# Patient Record
Sex: FEMALE | Race: WHITE | Employment: PART TIME | URBAN - METROPOLITAN AREA
[De-identification: names, ages, dates, MRNs, and addresses within clinical notes are randomized per-mention and may not be internally consistent; named-entity substitution may affect disease eponyms.]

---

## 2023-06-12 PROBLEM — Z12.11 SPECIAL SCREENING FOR MALIGNANT NEOPLASM OF COLON: Status: ACTIVE | Noted: 2023-06-12

## 2023-06-12 PROBLEM — Z13.220 SCREENING CHOLESTEROL LEVEL: Status: ACTIVE | Noted: 2023-06-12

## 2023-06-12 PROBLEM — Z11.59 ENCOUNTER FOR HEPATITIS C SCREENING TEST FOR LOW RISK PATIENT: Status: ACTIVE | Noted: 2023-06-12

## 2023-06-12 PROBLEM — Z01.419 ENCOUNTER FOR WELL WOMAN EXAM: Status: ACTIVE | Noted: 2023-06-12

## 2023-06-12 PROBLEM — G50.0 TRIGEMINAL NEURALGIA OF LEFT SIDE OF FACE: Status: ACTIVE | Noted: 2019-07-19

## 2023-06-12 PROBLEM — Z13.1 DIABETES MELLITUS SCREENING: Status: ACTIVE | Noted: 2023-06-12

## 2023-06-12 PROBLEM — R51.9 NONINTRACTABLE HEADACHE: Status: ACTIVE | Noted: 2023-06-12

## 2023-06-12 PROBLEM — Z11.4 ENCOUNTER FOR SCREENING FOR HIV: Status: ACTIVE | Noted: 2023-06-12

## 2023-06-12 PROBLEM — Z23 IMMUNIZATION DUE: Status: ACTIVE | Noted: 2023-06-12

## 2023-06-12 PROBLEM — L02.91 ABSCESS: Status: ACTIVE | Noted: 2023-06-12

## 2023-06-29 ENCOUNTER — TELEPHONE (OUTPATIENT)
Dept: PRIMARY CARE CLINIC | Age: 48
End: 2023-06-29

## 2023-06-29 NOTE — TELEPHONE ENCOUNTER
Called and spoke to patient she stated they called her to schedule but she needs to call them back as soon as she can

## 2023-07-12 PROBLEM — Z13.1 DIABETES MELLITUS SCREENING: Status: RESOLVED | Noted: 2023-06-12 | Resolved: 2023-07-12

## 2023-07-12 PROBLEM — Z01.419 ENCOUNTER FOR WELL WOMAN EXAM: Status: RESOLVED | Noted: 2023-06-12 | Resolved: 2023-07-12

## 2023-07-12 PROBLEM — Z11.4 ENCOUNTER FOR SCREENING FOR HIV: Status: RESOLVED | Noted: 2023-06-12 | Resolved: 2023-07-12

## 2023-07-12 PROBLEM — Z12.11 SPECIAL SCREENING FOR MALIGNANT NEOPLASM OF COLON: Status: RESOLVED | Noted: 2023-06-12 | Resolved: 2023-07-12

## 2023-07-12 PROBLEM — Z11.59 ENCOUNTER FOR HEPATITIS C SCREENING TEST FOR LOW RISK PATIENT: Status: RESOLVED | Noted: 2023-06-12 | Resolved: 2023-07-12

## 2023-07-12 PROBLEM — Z13.220 SCREENING CHOLESTEROL LEVEL: Status: RESOLVED | Noted: 2023-06-12 | Resolved: 2023-07-12

## 2023-07-19 ENCOUNTER — TELEPHONE (OUTPATIENT)
Dept: PRIMARY CARE CLINIC | Age: 48
End: 2023-07-19

## 2023-07-19 NOTE — TELEPHONE ENCOUNTER
Spoke with patient regarding both Gyn and Colonoscopy referrals, states she has a ton of appointments scheduled the next few months. She doesn't plan to scheduled these appointments until \"maybe fall\". Advised we can follow up with her after October when he plan to schedule.

## 2023-07-19 NOTE — TELEPHONE ENCOUNTER
----- Message from Bunny Lee MD sent at 7/19/2023 10:03 AM EDT -----  For this patient, can you see if she needs help scheduling with Dr. Arjun Justice (GYN)    Also ask about Cologuard, given new changes and if she would prefer this to colonoscopy.

## 2023-09-19 ENCOUNTER — TELEPHONE (OUTPATIENT)
Dept: PRIMARY CARE CLINIC | Age: 48
End: 2023-09-19

## 2023-09-19 NOTE — TELEPHONE ENCOUNTER
4:07 PM  LM for pt to cb with gyn  information. We are trying to send a record release to obtain her last pap smear results.    LOV 6/12/23  POV: 10/25/23

## 2023-09-22 DIAGNOSIS — Z13.1 DIABETES MELLITUS SCREENING: ICD-10-CM

## 2023-09-22 DIAGNOSIS — Z13.220 SCREENING CHOLESTEROL LEVEL: ICD-10-CM

## 2023-09-22 DIAGNOSIS — Z11.4 ENCOUNTER FOR SCREENING FOR HIV: ICD-10-CM

## 2023-09-22 DIAGNOSIS — Z11.59 ENCOUNTER FOR HEPATITIS C SCREENING TEST FOR LOW RISK PATIENT: ICD-10-CM

## 2023-09-22 LAB
ALBUMIN SERPL-MCNC: 4.3 G/DL (ref 3.4–5)
ALBUMIN/GLOB SERPL: 1.8 {RATIO} (ref 1.1–2.2)
ALP SERPL-CCNC: 52 U/L (ref 40–129)
ALT SERPL-CCNC: 18 U/L (ref 10–40)
ANION GAP SERPL CALCULATED.3IONS-SCNC: 9 MMOL/L (ref 3–16)
AST SERPL-CCNC: 18 U/L (ref 15–37)
BILIRUB SERPL-MCNC: 0.3 MG/DL (ref 0–1)
BUN SERPL-MCNC: 12 MG/DL (ref 7–20)
CALCIUM SERPL-MCNC: 8.7 MG/DL (ref 8.3–10.6)
CHLORIDE SERPL-SCNC: 97 MMOL/L (ref 99–110)
CHOLEST SERPL-MCNC: 151 MG/DL (ref 0–199)
CO2 SERPL-SCNC: 27 MMOL/L (ref 21–32)
CREAT SERPL-MCNC: 0.7 MG/DL (ref 0.6–1.1)
GFR SERPLBLD CREATININE-BSD FMLA CKD-EPI: >60 ML/MIN/{1.73_M2}
GLUCOSE SERPL-MCNC: 85 MG/DL (ref 70–99)
HCV AB SERPL QL IA: NORMAL
HDLC SERPL-MCNC: 54 MG/DL (ref 40–60)
LDLC SERPL CALC-MCNC: 85 MG/DL
POTASSIUM SERPL-SCNC: 4.8 MMOL/L (ref 3.5–5.1)
PROT SERPL-MCNC: 6.7 G/DL (ref 6.4–8.2)
SODIUM SERPL-SCNC: 133 MMOL/L (ref 136–145)
TRIGL SERPL-MCNC: 60 MG/DL (ref 0–150)
VLDLC SERPL CALC-MCNC: 12 MG/DL

## 2023-09-23 LAB
EST. AVERAGE GLUCOSE BLD GHB EST-MCNC: 96.8 MG/DL
HBA1C MFR BLD: 5 %
HIV 1+2 AB+HIV1 P24 AG SERPL QL IA: NORMAL
HIV 2 AB SERPL QL IA: NORMAL
HIV1 AB SERPL QL IA: NORMAL
HIV1 P24 AG SERPL QL IA: NORMAL

## 2023-09-25 ENCOUNTER — TELEPHONE (OUTPATIENT)
Dept: PRIMARY CARE CLINIC | Age: 48
End: 2023-09-25

## 2023-09-25 NOTE — TELEPHONE ENCOUNTER
Spoke with pt to offer assistance with scheduling her colonoscopy. Pt sts she has to wait for her  to provide dates he will be able to take her to the procedure and then she will get it scheduled. Advised pt to call if she needs any assistance with scheduling the colonoscopy.

## 2023-10-25 ENCOUNTER — OFFICE VISIT (OUTPATIENT)
Dept: PRIMARY CARE CLINIC | Age: 48
End: 2023-10-25
Payer: COMMERCIAL

## 2023-10-25 VITALS
TEMPERATURE: 97.8 F | HEIGHT: 68 IN | RESPIRATION RATE: 19 BRPM | SYSTOLIC BLOOD PRESSURE: 116 MMHG | BODY MASS INDEX: 39.53 KG/M2 | DIASTOLIC BLOOD PRESSURE: 74 MMHG | HEART RATE: 74 BPM | OXYGEN SATURATION: 99 % | WEIGHT: 260.8 LBS

## 2023-10-25 DIAGNOSIS — Z00.00 HEALTHCARE MAINTENANCE: Primary | ICD-10-CM

## 2023-10-25 DIAGNOSIS — Z12.31 ENCOUNTER FOR SCREENING MAMMOGRAM FOR MALIGNANT NEOPLASM OF BREAST: ICD-10-CM

## 2023-10-25 DIAGNOSIS — Z23 IMMUNIZATION DUE: ICD-10-CM

## 2023-10-25 PROCEDURE — 99396 PREV VISIT EST AGE 40-64: CPT | Performed by: FAMILY MEDICINE

## 2023-10-25 PROCEDURE — 90471 IMMUNIZATION ADMIN: CPT | Performed by: FAMILY MEDICINE

## 2023-10-25 PROCEDURE — 90674 CCIIV4 VAC NO PRSV 0.5 ML IM: CPT | Performed by: FAMILY MEDICINE

## 2023-10-25 SDOH — ECONOMIC STABILITY: FOOD INSECURITY: WITHIN THE PAST 12 MONTHS, THE FOOD YOU BOUGHT JUST DIDN'T LAST AND YOU DIDN'T HAVE MONEY TO GET MORE.: NEVER TRUE

## 2023-10-25 SDOH — ECONOMIC STABILITY: HOUSING INSECURITY
IN THE LAST 12 MONTHS, WAS THERE A TIME WHEN YOU DID NOT HAVE A STEADY PLACE TO SLEEP OR SLEPT IN A SHELTER (INCLUDING NOW)?: NO

## 2023-10-25 SDOH — ECONOMIC STABILITY: INCOME INSECURITY: HOW HARD IS IT FOR YOU TO PAY FOR THE VERY BASICS LIKE FOOD, HOUSING, MEDICAL CARE, AND HEATING?: NOT HARD AT ALL

## 2023-10-25 SDOH — ECONOMIC STABILITY: FOOD INSECURITY: WITHIN THE PAST 12 MONTHS, YOU WORRIED THAT YOUR FOOD WOULD RUN OUT BEFORE YOU GOT MONEY TO BUY MORE.: NEVER TRUE

## 2023-10-25 ASSESSMENT — ENCOUNTER SYMPTOMS
NAUSEA: 0
BLOOD IN STOOL: 0
VOMITING: 0
COUGH: 0
RHINORRHEA: 0
COLOR CHANGE: 0
ABDOMINAL PAIN: 0
DIARRHEA: 0
SHORTNESS OF BREATH: 0

## 2023-10-25 ASSESSMENT — PATIENT HEALTH QUESTIONNAIRE - PHQ9
SUM OF ALL RESPONSES TO PHQ QUESTIONS 1-9: 0
SUM OF ALL RESPONSES TO PHQ QUESTIONS 1-9: 0
2. FEELING DOWN, DEPRESSED OR HOPELESS: 0
1. LITTLE INTEREST OR PLEASURE IN DOING THINGS: 0
SUM OF ALL RESPONSES TO PHQ QUESTIONS 1-9: 0
SUM OF ALL RESPONSES TO PHQ QUESTIONS 1-9: 0
SUM OF ALL RESPONSES TO PHQ9 QUESTIONS 1 & 2: 0

## 2023-10-25 NOTE — ASSESSMENT & PLAN NOTE
Overall doing well. Age appropriate screening provided as per orders below. Counseled briefly and provided written materials on recommendations for exercise and healthy diet.

## 2023-10-25 NOTE — PATIENT INSTRUCTIONS
For your colonoscopy:  Dr. Erlin López MD   [address] [facility] [place], [address], [address] [facility],[address]   [phone]    Reminder: Please call to schedule eye exam when able. Zevia soda. Lifestyle modifications discussed today:    Exercise: In accordance with AHA/ACC guidelines:    - Get at least 150 minutes per week of moderate-intensity aerobic activity OR 75 minutes per week of vigorous aerobic activity, OR a combination of both, preferably spread throughout the week. - Add moderate- to high-intensity muscle-strengthening activity (such as resistance or weights) on at least 2 days per week. - Gain even more benefits by being active at least 300 minutes (5 hours) per week. Increase amount and intensity gradually over time. Diet:    Dietary pattern should consist of vegetables, fruits and whole grains, low-fat dairy (or no dairy if intolerant), suggested poultry and fish. Consider legumes, vegetable oils and nuts and limiting intake of sweets, sugar, sweetened beverages and red meat. Aim is to have 5% of calories from saturated fats or less and no trans fat.

## 2023-10-25 NOTE — PROGRESS NOTES
Yoli Garcia is a 53y.o. year old female here for:    Chief Complaint:    Chief Complaint   Patient presents with    Annual Exam     Subjective: Today, her current concerns include:    Preventive Services:    Health Maintenance History:  Patient exercises regularly? Yes, daily and at least 30 mins each time  Diet? Tries to eat a healthy diet when able, some soda, lots of water  Dental: Last visit was a few weeks ago  Glasses/Eyes: Last visit was 1.5 years ago    Other Health Maintenance History:  Patient has previous history of abnormal breast mass?: yes and was found to be benign  Patient has previous history of abnormal pap smear?:  yes but then f/u ones were normal  Patient is on Hormone Replacement therapy or Birth Control? no  Sexually active? One male partner - monogamous  In the past two weeks have they been bothered by feeling \"down\", depressed or hopeless?  no  In the past two weeks, have they experienced a loss of interest or pleasure in doing things?  no  In the last year, have you fallen more than once or been seriously injured in a fall?  no  Advance Directives: Not in place. Provided instructions today on how to sign up/provide these on [facility].     Health Maintenance Screening:  Diabetes screening: was not provided today - UTD on this  Cholesterol screening: was not provided today - UTD on this - The 10-year ASCVD risk score (Mica CAREY, et al., 2019) is: 0.5% - no ASA or statin indicated  Pelvic exam and pap smear: was not performed today - has visit scheduled for next week  Screening mammogram order slip: was provided (no personal history of breast CA, family hx as noted) today  Bone Density test order: was not applicable to this patient based on their risk factors and evidence based recommendations today  Colorectal cancer screening (Screening colonoscopy) order: was not provided (no personal or history of colon CA, family hx as noted) today - previously provided, provided information today

## 2023-11-01 ENCOUNTER — OFFICE VISIT (OUTPATIENT)
Dept: GYNECOLOGY | Age: 48
End: 2023-11-01
Payer: COMMERCIAL

## 2023-11-01 VITALS — SYSTOLIC BLOOD PRESSURE: 128 MMHG | OXYGEN SATURATION: 99 % | DIASTOLIC BLOOD PRESSURE: 80 MMHG | HEART RATE: 73 BPM

## 2023-11-01 DIAGNOSIS — N93.9 ABNORMAL UTERINE BLEEDING (AUB): ICD-10-CM

## 2023-11-01 DIAGNOSIS — N92.0 MENORRHAGIA WITH REGULAR CYCLE: ICD-10-CM

## 2023-11-01 DIAGNOSIS — N94.6 DYSMENORRHEA: ICD-10-CM

## 2023-11-01 DIAGNOSIS — Z01.419 WELL WOMAN EXAM WITH ROUTINE GYNECOLOGICAL EXAM: Primary | ICD-10-CM

## 2023-11-01 LAB
DEPRECATED RDW RBC AUTO: 13.2 % (ref 12.4–15.4)
FSH SERPL-ACNC: 7.9 MIU/ML
HCT VFR BLD AUTO: 40.8 % (ref 36–48)
HGB BLD-MCNC: 13.8 G/DL (ref 12–16)
LH SERPL-ACNC: 3.5 MIU/ML
MCH RBC QN AUTO: 29.9 PG (ref 26–34)
MCHC RBC AUTO-ENTMCNC: 33.9 G/DL (ref 31–36)
MCV RBC AUTO: 88.3 FL (ref 80–100)
PLATELET # BLD AUTO: 235 K/UL (ref 135–450)
PMV BLD AUTO: 8.3 FL (ref 5–10.5)
PROLACTIN SERPL IA-MCNC: 18 NG/ML
RBC # BLD AUTO: 4.62 M/UL (ref 4–5.2)
TSH SERPL DL<=0.005 MIU/L-ACNC: 2.16 UIU/ML (ref 0.27–4.2)
WBC # BLD AUTO: 5.8 K/UL (ref 4–11)

## 2023-11-01 PROCEDURE — 99386 PREV VISIT NEW AGE 40-64: CPT | Performed by: OBSTETRICS & GYNECOLOGY

## 2023-11-01 NOTE — PROGRESS NOTES
SUBJECTIVE:  Christian Shah is an 53y.o. year old woman who presents for annual gyn exam.    Complains of abnormal bleeding, menorrhagia, and dysmenorrhea. Patient reports that over the last year she has been having irregular abnormal bleeding pattern. Previously, she reports that her cycles were every 26 days. She reports menorrhagia with heavy days and cycles lasting about 8 days. She will have associated dysmenorrhea. No lightheadedness or dizziness. No shortness of breath or chest pain. No galactorrhea. REVIEW OF SYSTEMS:  No complaints of symptoms involving:  Constitutional: there has been no unanticipated weight loss. There's been no change in activity level. Negative for fever, chills. Eyes: No visual changes, double vision, or scotomata. No scleral icterus. HENT: No Headaches, hearing loss or vertigo. No sore throat, ear pain or nasal congestion  Respiratory: no cough or wheezing, no sputum production, no hemoptysis. Gastrointestinal: No abdominal pain, appetite loss, blood in stools. No change in bowel habits. Genitourinary: No dysuria, trouble voiding, or hematuria. No change in bladder habits. Musculoskeletal:  No gait disturbance,no weakness or joint complaints. Skin: No rash or pruritis. Neurological: No headache, vision changes, change in muscle strength, numbness or tingling. No change in gait, balance, coordination. Psychiatric: No anxiety, or depression. No change in mood or behavior. Endocrine: No temperature intolerance. No excessive thirst, fluid intake, or urination. No tremor. Hematologic/Lymphatic: No abnormal bruising or bleeding, blood clots or swollen lymph nodes.        Patient Active Problem List   Diagnosis    Trigeminal neuralgia of left side of face    Nonintractable headache    Abscess    Immunization due    Encounter for screening mammogram for malignant neoplasm of breast    Healthcare maintenance     Current Outpatient Medications   Medication Sig Dispense

## 2023-11-01 NOTE — PROGRESS NOTES
The sensitive parts of the examination were performed with Charity Armijo MA as a chaperone. Reema Wen was present during the entirety of the sensitive parts of the examination.

## 2023-11-02 LAB — HPV16+18+H RISK 12 DNA SPEC-IMP: NORMAL

## 2023-11-07 LAB
C TRACH DNA CVX QL NAA+PROBE: NEGATIVE
HPV HR 12 DNA SPEC QL NAA+PROBE: NOT DETECTED
HPV16 DNA SPEC QL NAA+PROBE: NOT DETECTED
HPV16+18+H RISK 12 DNA SPEC-IMP: NORMAL
HPV18 DNA SPEC QL NAA+PROBE: NOT DETECTED
N GONORRHOEA DNA SPEC QL NAA+PROBE: NEGATIVE

## 2023-11-08 ENCOUNTER — HOSPITAL ENCOUNTER (OUTPATIENT)
Dept: ULTRASOUND IMAGING | Age: 48
Discharge: HOME | End: 2023-11-08
Payer: COMMERCIAL

## 2023-11-08 DIAGNOSIS — N94.6 DYSMENORRHEA: ICD-10-CM

## 2023-11-08 DIAGNOSIS — N93.9 ABNORMAL UTERINE BLEEDING (AUB): ICD-10-CM

## 2023-11-08 DIAGNOSIS — N92.0 MENORRHAGIA WITH REGULAR CYCLE: ICD-10-CM

## 2023-11-08 PROCEDURE — 76830 TRANSVAGINAL US NON-OB: CPT

## 2023-11-08 PROCEDURE — 76856 US EXAM PELVIC COMPLETE: CPT

## 2023-11-24 PROBLEM — Z12.31 ENCOUNTER FOR SCREENING MAMMOGRAM FOR MALIGNANT NEOPLASM OF BREAST: Status: RESOLVED | Noted: 2023-10-25 | Resolved: 2023-11-24

## 2023-11-24 PROBLEM — Z00.00 HEALTHCARE MAINTENANCE: Status: RESOLVED | Noted: 2023-10-25 | Resolved: 2023-11-24

## 2024-09-19 NOTE — ASSESSMENT & PLAN NOTE
Age appropriate screening provided as per orders below. Refill request for Hydrocodone. Please see in pending.    Last appointment: 9/13/24  Next appointment: 10/14/24  Last written date: 8/22/24  Last MMA: 7/8/24  Last Drug screen: 7/8/24

## 2025-05-19 ENCOUNTER — OFFICE VISIT (OUTPATIENT)
Age: 50
End: 2025-05-19

## 2025-05-19 VITALS
BODY MASS INDEX: 39.4 KG/M2 | DIASTOLIC BLOOD PRESSURE: 99 MMHG | OXYGEN SATURATION: 99 % | WEIGHT: 260 LBS | HEIGHT: 68 IN | HEART RATE: 79 BPM | SYSTOLIC BLOOD PRESSURE: 143 MMHG

## 2025-05-19 DIAGNOSIS — N92.0 MENORRHAGIA WITH REGULAR CYCLE: Primary | ICD-10-CM

## 2025-05-19 DIAGNOSIS — N93.9 ABNORMAL UTERINE BLEEDING (AUB): ICD-10-CM

## 2025-05-19 DIAGNOSIS — N94.6 DYSMENORRHEA: ICD-10-CM

## 2025-05-19 DIAGNOSIS — N92.0 MENORRHAGIA WITH REGULAR CYCLE: ICD-10-CM

## 2025-05-19 RX ORDER — TRANEXAMIC ACID 650 MG/1
1300 TABLET ORAL 3 TIMES DAILY
Qty: 30 TABLET | Refills: 0 | Status: SHIPPED | OUTPATIENT
Start: 2025-05-19

## 2025-05-19 RX ORDER — MEDROXYPROGESTERONE ACETATE 10 MG
20 TABLET ORAL DAILY
Qty: 60 TABLET | Refills: 0 | Status: SHIPPED | OUTPATIENT
Start: 2025-05-19 | End: 2025-06-18

## 2025-05-20 LAB
DEPRECATED RDW RBC AUTO: 13.5 % (ref 12.4–15.4)
FSH SERPL-ACNC: 12.2 MIU/ML
HCT VFR BLD AUTO: 39.4 % (ref 36–48)
HGB BLD-MCNC: 13.2 G/DL (ref 12–16)
LH SERPL-ACNC: 3.4 MIU/ML
MCH RBC QN AUTO: 29.8 PG (ref 26–34)
MCHC RBC AUTO-ENTMCNC: 33.5 G/DL (ref 31–36)
MCV RBC AUTO: 88.8 FL (ref 80–100)
PLATELET # BLD AUTO: 283 K/UL (ref 135–450)
PMV BLD AUTO: 8.3 FL (ref 5–10.5)
RBC # BLD AUTO: 4.44 M/UL (ref 4–5.2)
TSH SERPL DL<=0.005 MIU/L-ACNC: 1.6 UIU/ML (ref 0.27–4.2)
WBC # BLD AUTO: 7.3 K/UL (ref 4–11)

## 2025-05-21 ENCOUNTER — RESULTS FOLLOW-UP (OUTPATIENT)
Dept: GYNECOLOGY | Age: 50
End: 2025-05-21

## 2025-06-20 ENCOUNTER — OFFICE VISIT (OUTPATIENT)
Dept: GYNECOLOGY | Age: 50
End: 2025-06-20
Payer: COMMERCIAL

## 2025-06-20 VITALS
BODY MASS INDEX: 41.81 KG/M2 | OXYGEN SATURATION: 96 % | WEIGHT: 275 LBS | DIASTOLIC BLOOD PRESSURE: 84 MMHG | HEART RATE: 89 BPM | SYSTOLIC BLOOD PRESSURE: 124 MMHG

## 2025-06-20 DIAGNOSIS — N93.9 ABNORMAL UTERINE BLEEDING (AUB): ICD-10-CM

## 2025-06-20 DIAGNOSIS — N94.6 DYSMENORRHEA: ICD-10-CM

## 2025-06-20 DIAGNOSIS — N92.0 MENORRHAGIA WITH REGULAR CYCLE: Primary | ICD-10-CM

## 2025-06-20 PROCEDURE — 99214 OFFICE O/P EST MOD 30 MIN: CPT | Performed by: OBSTETRICS & GYNECOLOGY

## 2025-06-20 NOTE — PROGRESS NOTES
History:   Procedure Laterality Date    BRAIN SURGERY  2021    CHOLECYSTECTOMY      KNEE SURGERY Right     ACL and meniscus repairs    TRIGEMINAL NERVE DECOMPRESSION Left      OB History          2    Para   2    Term   2            AB        Living   3         SAB        IAB        Ectopic        Molar        Multiple   1    Live Births   3              Current Outpatient Medications on File Prior to Visit   Medication Sig Dispense Refill    OXcarbazepine (TRILEPTAL) 300 MG tablet TAKE 2 TABLETS(600 MG) BY MOUTH TWICE DAILY      verapamil (CALAN SR) 120 MG extended release tablet Take 1 tablet by mouth 2 times daily      pregabalin (LYRICA) 100 MG capsule TAKE 1 CAPSULE(100 MG) BY MOUTH TWICE DAILY      SUMAtriptan (IMITREX) 20 MG/ACT nasal spray 20mg once in a single nostril. May repeat dose in 2 hours. Max dose 40mg in 24 hours. Indications: migraine (Patient not taking: Reported on 2025)       No current facility-administered medications on file prior to visit.     Allergy: Codeine and Sulfa antibiotics  Social History     Tobacco Use    Smoking status: Former     Current packs/day: 0.00     Average packs/day: 0.5 packs/day for 10.5 years (5.2 ttl pk-yrs)     Types: Cigarettes     Start date:      Quit date: 2004     Years since quittin.0    Smokeless tobacco: Never   Substance Use Topics    Alcohol use: Yes     Alcohol/week: 2.0 standard drinks of alcohol     Types: 1 Glasses of wine, 1 Cans of beer per week     Comment: rarely - at most 1-2 drinks per week     Family History   Problem Relation Age of Onset    High Blood Pressure Mother     Lung Cancer Mother     High Blood Pressure Father     Lung Cancer Father     Stroke Sister         age 45    Colon Cancer Paternal Aunt         Dx age in her 60s    Breast Cancer Paternal Aunt     Breast Cancer Paternal Grandmother         Dx in the 50s    Stroke Sister      Social History     Socioeconomic History    Marital status:

## 2025-06-23 ENCOUNTER — TELEPHONE (OUTPATIENT)
Dept: GYNECOLOGY | Age: 50
End: 2025-06-23

## 2025-06-23 NOTE — TELEPHONE ENCOUNTER
Radha from [facility] called bc this patient had a Mirena IUD prescription was sent to [facility] when it should have been sent to them, need a prescription and Dr. Schwartz needs to know that the patient is also received Oxcarbazepie which can affect the IUD.     So they need the prescription sent to them and they need confirmation that this IUD can still be prescribed.      They can be reached at [phone] (can speak to any pharmacist)

## 2025-07-25 ENCOUNTER — PROCEDURE VISIT (OUTPATIENT)
Dept: GYNECOLOGY | Age: 50
End: 2025-07-25

## 2025-07-25 VITALS
SYSTOLIC BLOOD PRESSURE: 146 MMHG | OXYGEN SATURATION: 97 % | WEIGHT: 279 LBS | HEART RATE: 72 BPM | BODY MASS INDEX: 42.42 KG/M2 | DIASTOLIC BLOOD PRESSURE: 86 MMHG

## 2025-07-25 DIAGNOSIS — Z30.430 ENCOUNTER FOR INSERTION OF 52 MG LEVONORGESTREL-RELEASING INTRAUTERINE DEVICE (IUD): Primary | ICD-10-CM

## 2025-07-25 LAB
CONTROL: NORMAL
PREGNANCY TEST URINE, POC: NEGATIVE

## 2025-07-25 NOTE — PROGRESS NOTES
CARMELLA Escamilla is a 50 y.o. female here today for Mirena intrauterine device  (IUD) insertion.  She has been previously counseled.  I again counseled her. Warnings and instructions were given including risks of insertion, including perforation, infection and need for surgery.  The patient is also aware of the risk of IUD failure.  She was educated regarding checking her strings, avoidance of sexually transmitted diseases (STDs), and the anticipated bleeding profile after insertion.  She is able to voice understanding, consents to proceed with IUD insertion.    Review of systems:  No complaints of symptoms involving:  Constitutional: negative for fever, chills.  Eyes: No change in vision, double vision, or scotomata.  HENT: No sore throat, ear pain or nasal congestion.  Respiratory: No cough, shortness of breath or hemoptysis.  Cardiovascular: No chest pain, orthopnea, fainting.  Skin: No pruritus or generalized rash.  Neurologic: No focal weakness or sensory changes.   Gynecologic: regular periods    Patient Active Problem List   Diagnosis    Trigeminal neuralgia of left side of face    Nonintractable headache    Abscess    Immunization due     Past Medical History:   Diagnosis Date    Headache     Hx of seizure disorder 2010    None in years, tx in past, last meds in , no known etiology    Obesity ?    Seizure disorder (HCC) 2010    Trigeminal neuralgia     S/p surgery for this on left side     Past Surgical History:   Procedure Laterality Date    BRAIN SURGERY  2021    CHOLECYSTECTOMY      KNEE SURGERY Right     ACL and meniscus repairs    TRIGEMINAL NERVE DECOMPRESSION Left      OB History          2    Para   2    Term   2            AB        Living   3         SAB        IAB        Ectopic        Molar        Multiple   1    Live Births   3              Allergies   Allergen Reactions    Codeine     Sulfa Antibiotics      Current Outpatient Medications

## 2025-08-01 ENCOUNTER — OFFICE VISIT (OUTPATIENT)
Dept: GYNECOLOGY | Age: 50
End: 2025-08-01
Payer: COMMERCIAL

## 2025-08-01 VITALS
DIASTOLIC BLOOD PRESSURE: 80 MMHG | HEART RATE: 78 BPM | OXYGEN SATURATION: 96 % | WEIGHT: 278 LBS | BODY MASS INDEX: 42.27 KG/M2 | SYSTOLIC BLOOD PRESSURE: 122 MMHG

## 2025-08-01 DIAGNOSIS — N88.8 FRIABLE CERVIX: ICD-10-CM

## 2025-08-01 DIAGNOSIS — Z01.419 WELL WOMAN EXAM WITH ROUTINE GYNECOLOGICAL EXAM: Primary | ICD-10-CM

## 2025-08-01 DIAGNOSIS — Z11.3 SCREEN FOR STD (SEXUALLY TRANSMITTED DISEASE): ICD-10-CM

## 2025-08-01 DIAGNOSIS — Z97.5 PRESENCE OF 52 MG LEVONORGESTREL-RELEASING INTRAUTERINE DEVICE (IUD): ICD-10-CM

## 2025-08-01 PROCEDURE — 99459 PELVIC EXAMINATION: CPT | Performed by: OBSTETRICS & GYNECOLOGY

## 2025-08-01 PROCEDURE — 99396 PREV VISIT EST AGE 40-64: CPT | Performed by: OBSTETRICS & GYNECOLOGY

## 2025-08-01 NOTE — PROGRESS NOTES
Kenyatta Escamilla is a 50 y.o. female who presents for evaluation after insertion of IUD.  Kenyatta verbalizes no complaints.     Kenyatta denies any fever, abdominal or pelvic pain, nausea or vomiting.  She denies dysuria or changes in normal bowel habits.    She does report spotting and we again discussed what her bleeding pattern may be over the next 3-6 months.   She denies other problems or complaints.    Review of systems:  No complaints of symptoms involving:  Constitutional: negative for fever, chills.  Eyes: No change in vision, double vision, or scotomata.  HENT: No sore throat, ear pain or nasal congestion.  Respiratory: No cough, shortness of breath or hemoptysis.  Cardiovascular: No chest pain, orthopnea, fainting.  Skin: No pruritus or generalized rash.  Neurologic: No focal weakness or sensory changes.   Gynecologic: see HPI    ALLERGIES:  Codeine and Sulfa antibiotics    OB History          2    Para   2    Term   2            AB        Living   3         SAB        IAB        Ectopic        Molar        Multiple   1    Live Births   3                Past medical history, past surgical history, social history, and family history are updated in the electronic medical record and reviewed.    Past Medical History:   Diagnosis Date    Headache     Hx of seizure disorder 2010    None in years, tx in past, last meds in , no known etiology    Obesity ?    Seizure disorder (HCC) 2010    Trigeminal neuralgia     S/p surgery for this on left side     Past Surgical History:   Procedure Laterality Date    BRAIN SURGERY  2021    CHOLECYSTECTOMY      KNEE SURGERY Right     ACL and meniscus repairs    TRIGEMINAL NERVE DECOMPRESSION Left      Social History     Tobacco Use    Smoking status: Former     Current packs/day: 0.00     Average packs/day: 0.5 packs/day for 10.5 years (5.2 ttl pk-yrs)     Types: Cigarettes     Start date:      Quit date: 2004     Years since quitting:

## 2025-08-01 NOTE — PROGRESS NOTES
SUBJECTIVE:  Kenyatta Escamilla is an 50 y.o. year old woman who presents for annual gyn exam.    REVIEW OF SYSTEMS:  No complaints of symptoms involving:  Constitutional: there has been no unanticipated weight loss. There's been no change in activity level. Negative for fever, chills.  Eyes: No visual changes, double vision, or scotomata. No scleral icterus.  HENT: No Headaches, hearing loss or vertigo. No sore throat, ear pain or nasal congestion  Respiratory: no cough or wheezing, no sputum production, no hemoptysis.    Gastrointestinal: No abdominal pain, appetite loss, blood in stools. No change in bowel habits.  Genitourinary: No dysuria, trouble voiding, or hematuria. No change in bladder habits.  Musculoskeletal:  No gait disturbance,no weakness or joint complaints.  Skin: No rash or pruritis.  Neurological: No headache, vision changes, change in muscle strength, numbness or tingling. No change in gait, balance, coordination.  Psychiatric: No anxiety, or depression. No change in mood or behavior.  Endocrine: No temperature intolerance. No excessive thirst, fluid intake, or urination. No tremor.  Hematologic/Lymphatic: No abnormal bruising or bleeding, blood clots or swollen lymph nodes.       Patient Active Problem List   Diagnosis    Trigeminal neuralgia of left side of face    Nonintractable headache    Abscess    Immunization due     Current Outpatient Medications   Medication Sig Dispense Refill    OXcarbazepine (TRILEPTAL) 300 MG tablet TAKE 2 TABLETS(600 MG) BY MOUTH TWICE DAILY      verapamil (CALAN SR) 120 MG extended release tablet Take 1 tablet by mouth 2 times daily      SUMAtriptan (IMITREX) 20 MG/ACT nasal spray       pregabalin (LYRICA) 100 MG capsule TAKE 1 CAPSULE(100 MG) BY MOUTH TWICE DAILY       No current facility-administered medications for this visit.     Past Medical History:   Diagnosis Date    Headache     Hx of seizure disorder 06/21/2010    None in years, tx in past, last meds in 2014,

## 2025-08-01 NOTE — PROGRESS NOTES
The sensitive parts of the examination were performed with Floridalma Perry MA as a chaperone.  Floridalma was present during the entirety of the sensitive parts of the examination.
